# Patient Record
Sex: FEMALE | Race: WHITE | NOT HISPANIC OR LATINO | Employment: UNEMPLOYED | URBAN - METROPOLITAN AREA
[De-identification: names, ages, dates, MRNs, and addresses within clinical notes are randomized per-mention and may not be internally consistent; named-entity substitution may affect disease eponyms.]

---

## 2023-05-16 DIAGNOSIS — M25.569 KNEE PAIN, UNSPECIFIED CHRONICITY, UNSPECIFIED LATERALITY: Primary | ICD-10-CM

## 2023-11-16 PROBLEM — N95.2 ATROPHIC VAGINITIS: Status: ACTIVE | Noted: 2023-11-16

## 2023-11-16 PROBLEM — J02.9 ACUTE PHARYNGITIS: Status: ACTIVE | Noted: 2023-11-16

## 2023-11-17 ENCOUNTER — OFFICE VISIT (OUTPATIENT)
Dept: OTOLARYNGOLOGY | Facility: CLINIC | Age: 53
End: 2023-11-17
Payer: COMMERCIAL

## 2023-11-17 VITALS — WEIGHT: 195 LBS | BODY MASS INDEX: 27.58 KG/M2

## 2023-11-17 DIAGNOSIS — H69.93 DYSFUNCTION OF BOTH EUSTACHIAN TUBES: ICD-10-CM

## 2023-11-17 DIAGNOSIS — M26.609 TEMPOROMANDIBULAR JOINT DISORDER: ICD-10-CM

## 2023-11-17 DIAGNOSIS — J31.0 CHRONIC RHINITIS: Primary | ICD-10-CM

## 2023-11-17 PROCEDURE — 99204 OFFICE O/P NEW MOD 45 MIN: CPT | Performed by: GENERAL PRACTICE

## 2023-11-17 PROCEDURE — 1036F TOBACCO NON-USER: CPT | Performed by: GENERAL PRACTICE

## 2023-11-17 RX ORDER — METHYLPREDNISOLONE 4 MG/1
TABLET ORAL
Qty: 21 TABLET | Refills: 0 | Status: SHIPPED | OUTPATIENT
Start: 2023-11-17

## 2023-11-17 RX ORDER — GUAIFENESIN, PSEUDOEPHEDRINE HYDROCHLORIDE 600; 60 MG/1; MG/1
1 TABLET, EXTENDED RELEASE ORAL EVERY 12 HOURS
Qty: 60 TABLET | Refills: 2 | Status: SHIPPED | OUTPATIENT
Start: 2023-11-17 | End: 2024-11-16

## 2023-11-17 RX ORDER — FLUTICASONE PROPIONATE 50 MCG
2 SPRAY, SUSPENSION (ML) NASAL DAILY
Qty: 16 G | Refills: 2 | Status: SHIPPED | OUTPATIENT
Start: 2023-11-17 | End: 2023-12-12

## 2023-11-17 ASSESSMENT — PATIENT HEALTH QUESTIONNAIRE - PHQ9
SUM OF ALL RESPONSES TO PHQ9 QUESTIONS 1 AND 2: 0
2. FEELING DOWN, DEPRESSED OR HOPELESS: NOT AT ALL
1. LITTLE INTEREST OR PLEASURE IN DOING THINGS: NOT AT ALL

## 2023-11-17 NOTE — PROGRESS NOTES
Otolaryngology - Head and Neck Surgery Outpatient New Patient Visit Note        Assessment/Plan   Problem List Items Addressed This Visit    None  Visit Diagnoses         Codes    Chronic rhinitis    -  Primary J31.0    Dysfunction of both eustachian tubes     H69.93    Relevant Orders    Referral to Audiology    Temporomandibular joint disorder     M26.609            Bothersome ETD symptoms after prior flight with URI at the time.  No otitis on exam.  Discussed controls for rhinitis and ETD to include flonase, mucinexD, possible medrol dosepack and the pt would like ot proceed with all of the above.  Frequent gentle valsalva.    Will acquire audio to assess for effect.     Also with a history of TMJ, discussed conservative management.         The etiology of temporomandibular joint disorders (TMD) was discussed in detail with patient including: structural issues such as alterations in dental occlusion (the alignment of the upper and lower teeth) that affect maxillomandibular position and function. These issues may or may not be associated with joint trauma, poor posture or cervicogenic etiologies.  Possible psychologic factors include anxiety, depression and PTSD. Multiple other contributing and comorbid factors, including biological, behavioral, environmental, and cognitive disorders which can all contribute to the development of symptoms were also reviewed with the patient.      PLAN:  1) Lifestyle interventions for management of TMJ dysfunction were discussed with the patient today including use of warm compresses, massage of the TMJ joint, and/or use of NSAIDS PRN for pain and intimation and soft chew diet.      Additional options for further evaluation and management of TMD were discussed with the patient today and may include the following referrals upon patient request:  1) Referral to physical therapy for further evaluation and management of cervicogenic/ postural related issues.  2) Referral to [facility] for evaluation and management with alternative therapeutic modalities such as massage or acupuncture.   3) Referral to dental medicine for further evaluation and management of structural/ mechanical concerns such as malocclusion or bruxism.       Follow up:  -plan for follow up in clinic as needed, after completion of ordered studies, and in 1-2 months    All of the above findings, impressions, treatment planning and follow up plans were discussed with the patient who indicated understanding.  the patient was instructed to contact or return to clinic sooner if symptoms/signs persist or worsen despite the above management.      Ricardo Arellano MD  Otolaryngology - Head and Neck Surgery            History Of Present Illness  53yoF presents for evaluation of bothersome left ear fullness, muffled hearing and R>L tinnitus.  Pt notes symptoms since flight from  in late .  Reports use of decongestants with brief improvement but no resolution.    Reports no significant prior history of recurrent otitis or ear trauma/surgery.   Notes some headache and head pressure without obvious sinusitis  Mild congestion without significant rhinorrhea/PND.     Denies GERD history         Reports a history of TMJ and intermittent use of a bite block.       Past Medical History  She has a past medical history of Pure hypercholesterolemia, unspecified (2013).    Surgical History  She has a past surgical history that includes  section, classic (2014).     Social History  She reports that she has never smoked. She has never used smokeless tobacco. No history on file for alcohol use and drug use.    Family History  No family history on file.     Allergies  Tree nut    Review of Systems  ROS: Pertinent positives as noted in HPI.    - CONSTITUTIONAL: Does not report weight loss, fever or chills.    - HEENT:   Ear: Does not report  , vertigo,    , otalgia, otorrhea  Nose: Does not report  , rhinorrhea,  epistaxis, decreased smell  Throat: Does not report pain, dysphagia, odynophagia  Larynx: Does not report hoarseness,  difficulty breathing, pain with speaking (odynophonia)  Neck: Does not report new masses, pain, swelling  Face: Does not report sinus pain, pressure, swelling, numbness, weakness     - RESPIRATORY: Does not report SOB or cough.    - CV: Does not report palpitations or chest pain.     - GI: Does not report abdominal pain, nausea, vomiting or diarrhea.    - : Does not report dysuria or urinary frequency.    - MSK: Does not report myalgia or joint pain.    - SKIN: Does not report rash or pruritus.    - NEUROLOGICAL: Does not report headache or syncope.    - PSYCHIATRIC: Does not report recent changes in mood. Does not report anxiety or depression.         Physical Exam:     GENERAL:   Alert & Oriented to person, place and time; Normal affect and appearance. Well developed and well nourished. Conversant & cooperative with examination.     HEAD:   Normocephalic, atraumatic. No sinus tenderness to palpation. Normal parotid bilaterally. Normal facial strength.   No TTP or obvious crepitus at TMJ.     NEUROLOGIC:   Cranial nerves II-XII grossly intact, gait WNL. Normal mood and affect.    EYES:   Extraocular movements intact. Pupils equal, round, reactive to light and accommodation. No nystagmus, no ptosis. no scleral injection.    EAR:   Normal auricle. No discomfort or TTP with manipulation.   Handheld otoscopic exam showed normal external auditory canals bilaterally. No purulence or EAC inflammation. Minimal cerumen.   Right tympanic membrane clear and poorly mobile without evidence of perforation, retraction or middle ear effusion.   Left tympanic membrane clear and poorly mobile without evidence of perforation, retraction or middle ear effusion.   [place]ahu to L with 512hz and AC>BC b/l.     NOSE:   No external deformity. No external nasal lesions, lacerations, or scars. Nasal tip symmetrical with normal  nasal valves.   Nasal cavity with essentially midline septum, edematous  mucosa and turbinates. No lesions, masses, purulence or polyps.     OC/OP:   Mucous membranes moist, no masses, lesions or exudates.   Normal tongue, floor of mouth, teeth, gums, lips. Normal posterior pharyngeal wall.    Normal tonsils without erythema, exudate or obvious calculi     NECK:   No neck masses or thyroid enlargement. Trachea midline. No tenderness to palpation    LYMPHATIC:   No cervical lymphadenopathy.     RESPIRATORY:   Symmetric chest elevation & no retractions. No significant hoarseness. No increased work of breathing.    CV:   No clubbing or cyanosis. No obvious edema    Skin:   No facial rashes, vesicles or lesions.     Extremities:   No gross abnormalities      Clinic Procedure        Information review:  External sources (notes, imaging, lab results) listed below personally reviewed to aid in medical decision making process.  -  -  -

## 2023-12-12 DIAGNOSIS — H69.93 DYSFUNCTION OF BOTH EUSTACHIAN TUBES: ICD-10-CM

## 2023-12-12 RX ORDER — FLUTICASONE PROPIONATE 50 MCG
2 SPRAY, SUSPENSION (ML) NASAL DAILY
Qty: 48 ML | Refills: 1 | Status: SHIPPED | OUTPATIENT
Start: 2023-12-12 | End: 2024-12-11

## 2023-12-14 ENCOUNTER — CLINICAL SUPPORT (OUTPATIENT)
Dept: AUDIOLOGY | Facility: CLINIC | Age: 53
End: 2023-12-14
Payer: COMMERCIAL

## 2023-12-14 DIAGNOSIS — H90.A22 SENSORINEURAL HEARING LOSS (SNHL) OF LEFT EAR WITH RESTRICTED HEARING OF RIGHT EAR: Primary | ICD-10-CM

## 2023-12-14 DIAGNOSIS — H90.A11 CONDUCTIVE HEARING LOSS OF RIGHT EAR WITH RESTRICTED HEARING OF LEFT EAR: ICD-10-CM

## 2023-12-14 DIAGNOSIS — H69.93 DYSFUNCTION OF BOTH EUSTACHIAN TUBES: ICD-10-CM

## 2023-12-14 PROCEDURE — 92557 COMPREHENSIVE HEARING TEST: CPT

## 2023-12-14 PROCEDURE — 92550 TYMPANOMETRY & REFLEX THRESH: CPT

## 2023-12-14 NOTE — PROGRESS NOTES
AUDIOLOGY ADULT EVALUATION    Name:  Eliazar Ibanez  :  1970  Age:  53 y.o.  Date of Evaluation:  2023    IMPRESSIONS     Today's test results indicate normal middle ear functioning with  mild sloping to moderate conductive heraing loss in the right ear and normal hearing sensitivity sloping to moderate sensorineural hearing los in the left ear. Asymmetries noted 125-3000 Hz, right ear being worse. Eliazar was encouraged to contact Dr. Arellano's office with concerns for recent medications and for follow up treatment plan due to asymmetric hearing loss found today.     RECOMMENDATIONS     Continue medical follow up with ENT as directed.   Re-test hearing in conjunction with otologic care, or annually to monitor.     Time: 13:00-13:30    HISTORY     Eliazar Ibanez, 53 year old female, was seen today for an audiologic evaluation at the referral of Dr. Arellano. Eliazar reported that about 4 months ago she went on a long flight and has been experiencing sinus issues, aural fullness, and increased perception of tinnitus. She noted that the pressure is mostly in the left ear and the tinnitus is mostly in the right ear. She was prescribed steroids by Dr. Arellano but noted that her  accidentally thew them away, so she has not been taking any medications. She denied dizziness, recent ear infections, otalgia, otorrhea, history of otologic surgeries or history of loud noise exposure.     EVALUATION         TEST RESULTS     Otoscopic Evaluation:  Right Ear: Clear ear canal with visible tympanic membrane.   Left Ear: Clear ear canal with visible tympanic membrane.    Tympanometry:   Right Ear: Normal, type A tympanogram with normal ear canal volume, peak pressure and compliance.   Left Ear: Normal, type A tympanogram with normal ear canal volume, peak pressure and compliance.     Ipsilateral Acoustic Reflexes:   Right Ear: Absent 500-4000 Hz.   Left Ear: Present 500-4000 Hz.     Pure Tone Audiometry:    Right Ear:  Mild conductive hearing loss 125-3000 Hz sloping to moderate mixed hearing loss through 8000 Hz.   Left Ear: Normal hearing sensitivity 125-3000 Hz sloping to moderate sensorineural hearing loss through 8000 Hz.     Speech Audiometry:   Right Ear:  Speech Reception Threshold (SRT) was obtained at 15 dBHL. Word Recognition scores were excellent in quiet when words were presented at 60 dBHL.  Left Ear:  Speech Reception Threshold (SRT) was obtained at 5 dBHL. Word Recognition scores were excellent in quiet when words were presented at 45 dBHL.      CHAD Rodriguez, CCC-A  Licensed Clinical Audiologist      Degree of Hearing Sensitivity Decibel Range   Within Normal Limits (WNL) 0-25   Mild 26-40   Moderate 41-55   Moderately-Severe 56-70   Severe 71-90   Profound 91+      Key   CNT/DNT Could Not Test/Did Not Test   TM Tympanic Membrane   WNL Within Normal Limits   HA Hearing Aid   SNHL Sensorineural Hearing Loss   CHL Conductive Hearing Loss   NIHL Noise-Induced Hearing Loss   ECV Ear Canal Volume   MLV Monitored Live Voice

## 2024-02-12 ENCOUNTER — APPOINTMENT (OUTPATIENT)
Dept: RADIOLOGY | Facility: CLINIC | Age: 54
End: 2024-02-12
Payer: COMMERCIAL

## 2024-02-13 ENCOUNTER — HOSPITAL ENCOUNTER (OUTPATIENT)
Dept: RADIOLOGY | Facility: CLINIC | Age: 54
Discharge: HOME | End: 2024-02-13
Payer: COMMERCIAL

## 2024-02-13 VITALS — WEIGHT: 195.11 LBS | HEIGHT: 71 IN | BODY MASS INDEX: 27.31 KG/M2

## 2024-02-13 DIAGNOSIS — Z12.31 ENCOUNTER FOR SCREENING MAMMOGRAM FOR MALIGNANT NEOPLASM OF BREAST: ICD-10-CM

## 2024-02-13 PROCEDURE — 77067 SCR MAMMO BI INCL CAD: CPT | Performed by: RADIOLOGY

## 2024-02-13 PROCEDURE — 77067 SCR MAMMO BI INCL CAD: CPT

## 2024-02-13 PROCEDURE — 77063 BREAST TOMOSYNTHESIS BI: CPT | Performed by: RADIOLOGY

## 2024-02-23 ENCOUNTER — APPOINTMENT (OUTPATIENT)
Dept: RADIOLOGY | Facility: CLINIC | Age: 54
End: 2024-02-23
Payer: COMMERCIAL

## 2025-05-22 ENCOUNTER — HOSPITAL ENCOUNTER (OUTPATIENT)
Dept: RADIOLOGY | Facility: CLINIC | Age: 55
Discharge: HOME | End: 2025-05-22
Payer: COMMERCIAL

## 2025-05-22 VITALS — BODY MASS INDEX: 27.31 KG/M2 | HEIGHT: 71 IN | WEIGHT: 195.11 LBS

## 2025-05-22 DIAGNOSIS — Z12.31 ENCOUNTER FOR SCREENING MAMMOGRAM FOR MALIGNANT NEOPLASM OF BREAST: ICD-10-CM

## 2025-05-22 PROCEDURE — 77067 SCR MAMMO BI INCL CAD: CPT

## 2025-05-22 PROCEDURE — 77067 SCR MAMMO BI INCL CAD: CPT | Performed by: RADIOLOGY

## 2025-05-22 PROCEDURE — 77063 BREAST TOMOSYNTHESIS BI: CPT | Performed by: RADIOLOGY
